# Patient Record
Sex: FEMALE | Race: WHITE | Employment: FULL TIME | URBAN - METROPOLITAN AREA
[De-identification: names, ages, dates, MRNs, and addresses within clinical notes are randomized per-mention and may not be internally consistent; named-entity substitution may affect disease eponyms.]

---

## 2017-01-30 ENCOUNTER — OFFICE VISIT (OUTPATIENT)
Dept: INTERNAL MEDICINE CLINIC | Age: 32
End: 2017-01-30

## 2017-01-30 VITALS
WEIGHT: 195.6 LBS | RESPIRATION RATE: 16 BRPM | DIASTOLIC BLOOD PRESSURE: 68 MMHG | TEMPERATURE: 98 F | SYSTOLIC BLOOD PRESSURE: 115 MMHG | OXYGEN SATURATION: 96 % | BODY MASS INDEX: 35.99 KG/M2 | HEART RATE: 82 BPM | HEIGHT: 62 IN

## 2017-01-30 DIAGNOSIS — G44.89 OTHER HEADACHE SYNDROME: ICD-10-CM

## 2017-01-30 DIAGNOSIS — S06.0X0D CONCUSSION, WITHOUT LOSS OF CONSCIOUSNESS, SUBSEQUENT ENCOUNTER: ICD-10-CM

## 2017-01-30 DIAGNOSIS — M62.838 NECK MUSCLE SPASM: Primary | ICD-10-CM

## 2017-01-30 RX ORDER — CYCLOBENZAPRINE HCL 10 MG
10 TABLET ORAL
Qty: 20 TAB | Refills: 1 | Status: SHIPPED | OUTPATIENT
Start: 2017-01-30

## 2017-01-30 NOTE — PROGRESS NOTES
Patient is in the office today due to a head injury with her hard hat at work. She needs a note to go back to work. 1. Have you been to the ER, urgent care clinic since your last visit? Hospitalized since your last visit? no    2. Have you seen or consulted any other health care providers outside of the [address] [place] [address] since your last visit? Include any pap smears or colon screening.  No

## 2017-01-30 NOTE — MR AVS SNAPSHOT
Visit Information Date & Time Provider Department Dept. Phone Encounter #  
 1/30/2017  3:30 PM Rashad Jeffery MD [facility] [phone] [id number] Your Appointments 8/14/2017  9:00 AM  
PHYSICAL with Rashad Jeffery MD  
Internist of [facility]) Appt Note: ov 1yr rd; rpe  
 [address] [facility], [address] [address] [address]  
  
   
 [address], [address] Upcoming Health Maintenance Date Due DTaP/Tdap/Td series (1 - Tdap) 11/19/2008 INFLUENZA AGE 9 TO ADULT 8/1/2016 PAP AKA CERVICAL CYTOLOGY 2/28/2019 Allergies as of 1/30/2017  Review Complete On: 1/30/2017 By: Luther Jacobsen LPN No Known Allergies Current Immunizations  Reviewed on 1/8/2015 No immunizations on file. Not reviewed this visit Vitals BP Pulse Temp Resp Height(growth percentile) Weight(growth percentile)  
 115/68 (BP 1 Location: Left arm, BP Patient Position: Sitting) 82 98 °F (36.7 °C) (Oral) 16 5' 2\" (1.575 m) 195 lb 9.6 oz (88.7 kg) SpO2 BMI OB Status Smoking Status 96% 35.78 kg/m2 Having regular periods Never Smoker Vitals History BMI and BSA Data Body Mass Index Body Surface Area 35.78 kg/m 2 1.97 m 2 Preferred Pharmacy Pharmacy Name Phone [address], Eliezer Hays [address] [place] [address] [facility] [phone] Your Updated Medication List  
  
   
This list is accurate as of: 1/30/17  4:26 PM.  Always use your most recent med list.  
  
  
  
  
 dextroamphetamine-amphetamine 10 mg tablet Commonly known as:  ADDERALL Take 1 tablet by mouth two (2) times a day. escitalopram oxalate 20 mg tablet Commonly known as:  Rey Barban Take 1 Tab by mouth daily. Introducing [facility]!    
 Rivas Ferrer introduces [employer] patient portal. Now you can access parts of your medical record, email your doctor's office, and request medication refills online. 1. In your internet browser, go to [email or web address] 2. Click on the First Time User? Click Here link in the Sign In box. You will see the New Member Sign Up page. 3. Enter your [employer] Access Code exactly as it appears below. You will not need to use this code after youve completed the sign-up process. If you do not sign up before the expiration date, you must request a new code. · [employer] Access Code: [email or web address] Expires: 4/30/2017  2:55 PM 
 
4. Enter the last four digits of your Social Security Number (xxxx) and Date of Birth (mm/dd/yyyy) as indicated and click Submit. You will be taken to the next sign-up page. 5. Create a [employer] ID. This will be your [employer] login ID and cannot be changed, so think of one that is secure and easy to remember. 6. Create a [employer] password. You can change your password at any time. 7. Enter your Password Reset Question and Answer. This can be used at a later time if you forget your password. 8. Enter your e-mail address. You will receive e-mail notification when new information is available in [address] 9. Click Sign Up. You can now view and download portions of your medical record. 10. Click the Download Summary menu link to download a portable copy of your medical information. If you have questions, please visit the Frequently Asked Questions section of the [employer] website. Remember, [employer] is NOT to be used for urgent needs. For medical emergencies, dial 911. Now available from your iPhone and Android! Please provide this summary of care documentation to your next provider. Your primary care clinician is listed as Geoff Zaragoza. If you have any questions after today's visit, please call [phone].

## 2017-01-31 NOTE — PROGRESS NOTES
36 y.o. WHITE OR  female who presents for evaluation. She was at work on 1/26/17 doing her usual duties, she was wearing a hard hat and trying to leave a work area when she hit her head right frontal side against a scaffolding. It felt like a whiplash injury although she didn't really feel much pain immediately after the event. The next day, she didn't feel right and she was having frontal and occipital intermittent headache along w neck pain without any focal neurologic complaints or radicular sx. [employer] sent her to the [facility] ER with her complaints. They did not do CT head and told her she had mild concussion and neck sprain, told her to take tylenol only w her gastric bypass hx. Currently, she reports no focal neuro complaints, the headache is not debilitating at all and is actually improved and she wants clearance to go back to work    Past Medical History   Diagnosis Date    Attention deficit disorder     Bilateral ovarian cysts      Dr. Santos Hernández Depression 6/16    Dyslipidemia     Obesity      s/p gastric sleeve Dr Currie Bigger 5/14; peak weight 235 lbs, preop wt 208 lbs, target weight 140 lbs    Sciatica      Past Surgical History   Procedure Laterality Date    Pr abdomen surgery proc unlisted  5/14     s/p gastric sleeve Dr Slava Garcia History    Marital status:      Spouse name: N/A    Number of children: 1    Years of education: N/A     Occupational History    [facility] at [facility]  Unknown     Social History Main Topics    Smoking status: Never Smoker    Smokeless tobacco: Never Used    Alcohol use No    Drug use: No    Sexual activity: Not on file     Other Topics Concern    Not on file     Social History Narrative     Current Outpatient Prescriptions   Medication Sig    escitalopram oxalate (LEXAPRO) 20 mg tablet Take 1 Tab by mouth daily.     dextroamphetamine-amphetamine (ADDERALL) 10 mg tablet Take 1 tablet by mouth two (2) times a day.     No current facility-administered medications for this visit. No Known Allergies    REVIEW OF SYSTEMS:   Ophtho - no vision change or eye pain  Oral - no mouth pain, tongue or tooth problems  Ears - no hearing loss, ear pain, fullness, no swallowing problems  Cardiac - no CP, PND, orthopnea, edema, palpitations or syncope  Chest - no breast masses  Resp - no wheezing, chronic coughing, dyspnea  GI - no heartburn, nausea, vomiting, change in bowel habits, bleeding, hemorrhoids  Urinary - no dysuria, hematuria, flank pain, urgency, frequency  Genitals - no genital lesions, discharge, masses, ulceration, warts  Neuro - no focal weakness, numbness, paresthesias, incoordination, ataxia, involuntary movements    Visit Vitals    /68 (BP 1 Location: Left arm, BP Patient Position: Sitting)    Pulse 82    Temp 98 °F (36.7 °C) (Oral)    Resp 16    Ht 5' 2\" (1.575 m)    Wt 195 lb 9.6 oz (88.7 kg)    SpO2 96%    BMI 35.78 kg/m2   A&O x3  Affect is appropriate. Mood stable  No apparent distress  Perrl, eomi  Anicteric, no JVD, adenopathy or thyromegaly. Neck supple w from, mod tenderness and spasm in the bilat paracervical musculature  No carotid bruits or radiated murmur  Lungs clear to auscultation, no wheezes or rales  Heart showed regular rate and rhythm. No murmur, rubs, gallops  Abdomen soft nontender, no hepatosplenomegaly or masses. Extremities without edema. Pulses 1-2+ symmetrically  Neurologic exam grossly nonfocal    Assessment and plan:  1. Probable mild concussion. Quick discussion on this matter. She opted on not getting head imaging as she is better the last couple days. 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 Neck pain and spasm. Flexeril given, she can take short course of motrin 3-5 days prn w meals to help w inflammation, stop if gi upset  3.  Work letter dictated      Above conditions discussed at length and patient vocalized understanding.   All questions answered to patient satifaction

## 2017-08-28 ENCOUNTER — HOSPITAL ENCOUNTER (OUTPATIENT)
Dept: LAB | Age: 32
Discharge: HOME | End: 2017-08-28
Payer: COMMERCIAL

## 2017-08-28 ENCOUNTER — OFFICE VISIT (OUTPATIENT)
Dept: INTERNAL MEDICINE CLINIC | Age: 32
End: 2017-08-28

## 2017-08-28 ENCOUNTER — TELEPHONE (OUTPATIENT)
Dept: INTERNAL MEDICINE CLINIC | Age: 32
End: 2017-08-28

## 2017-08-28 VITALS
WEIGHT: 175 LBS | TEMPERATURE: 98.3 F | RESPIRATION RATE: 12 BRPM | BODY MASS INDEX: 32.2 KG/M2 | SYSTOLIC BLOOD PRESSURE: 106 MMHG | DIASTOLIC BLOOD PRESSURE: 76 MMHG | HEART RATE: 90 BPM | HEIGHT: 62 IN | OXYGEN SATURATION: 97 %

## 2017-08-28 DIAGNOSIS — R11.0 NAUSEA: ICD-10-CM

## 2017-08-28 DIAGNOSIS — B27.90 MONONUCLEOSIS: Primary | ICD-10-CM

## 2017-08-28 LAB
ALBUMIN SERPL-MCNC: 2.8 G/DL (ref 3.4–5)
ALBUMIN/GLOB SERPL: 0.6 {RATIO} (ref 0.8–1.7)
ALP SERPL-CCNC: 404 U/L (ref 45–117)
ALT SERPL-CCNC: 100 U/L (ref 13–56)
ANION GAP SERPL CALC-SCNC: 5 MMOL/L (ref 3–18)
AST SERPL-CCNC: 61 U/L (ref 15–37)
BASOPHILS # BLD: 0 K/UL (ref 0–0.1)
BASOPHILS NFR BLD: 0 % (ref 0–3)
BILIRUB SERPL-MCNC: 0.5 MG/DL (ref 0.2–1)
BUN SERPL-MCNC: 9 MG/DL (ref 7–18)
BUN/CREAT SERPL: 11 (ref 12–20)
CALCIUM SERPL-MCNC: 9.4 MG/DL (ref 8.5–10.1)
CHLORIDE SERPL-SCNC: 102 MMOL/L (ref 100–108)
CO2 SERPL-SCNC: 32 MMOL/L (ref 21–32)
CREAT SERPL-MCNC: 0.82 MG/DL (ref 0.6–1.3)
DIFFERENTIAL METHOD BLD: ABNORMAL
EOSINOPHIL # BLD: 0 K/UL (ref 0–0.4)
EOSINOPHIL NFR BLD: 0 % (ref 0–5)
ERYTHROCYTE [DISTWIDTH] IN BLOOD BY AUTOMATED COUNT: 14.2 % (ref 11.6–14.5)
GLOBULIN SER CALC-MCNC: 5 G/DL (ref 2–4)
GLUCOSE SERPL-MCNC: 84 MG/DL (ref 74–99)
HCT VFR BLD AUTO: 40.6 % (ref 35–45)
HGB BLD-MCNC: 13.2 G/DL (ref 12–16)
LYMPHOCYTES # BLD: 7.7 K/UL (ref 0.8–3.5)
LYMPHOCYTES NFR BLD: 68 % (ref 20–51)
MCH RBC QN AUTO: 30.8 PG (ref 24–34)
MCHC RBC AUTO-ENTMCNC: 32.5 G/DL (ref 31–37)
MCV RBC AUTO: 94.9 FL (ref 74–97)
MONOCYTES # BLD: 1.4 K/UL (ref 0–1)
MONOCYTES NFR BLD: 12 % (ref 2–9)
NEUTS SEG # BLD: 2.3 K/UL (ref 1.8–8)
NEUTS SEG NFR BLD: 20 % (ref 42–75)
PLATELET # BLD AUTO: 181 K/UL (ref 135–420)
PLATELET COMMENTS,PCOM: ABNORMAL
PMV BLD AUTO: 10.4 FL (ref 9.2–11.8)
POTASSIUM SERPL-SCNC: 4.2 MMOL/L (ref 3.5–5.5)
PROT SERPL-MCNC: 7.8 G/DL (ref 6.4–8.2)
RBC # BLD AUTO: 4.28 M/UL (ref 4.2–5.3)
RBC MORPH BLD: ABNORMAL
SODIUM SERPL-SCNC: 139 MMOL/L (ref 136–145)
WBC # BLD AUTO: 11.4 K/UL (ref 4.6–13.2)
WBC MORPH BLD: ABNORMAL

## 2017-08-28 PROCEDURE — 80053 COMPREHEN METABOLIC PANEL: CPT | Performed by: INTERNAL MEDICINE

## 2017-08-28 PROCEDURE — 85025 COMPLETE CBC W/AUTO DIFF WBC: CPT | Performed by: INTERNAL MEDICINE

## 2017-08-28 RX ORDER — ONDANSETRON 4 MG/1
4 TABLET, ORALLY DISINTEGRATING ORAL
COMMUNITY

## 2017-08-28 RX ORDER — NORETHINDRONE ACETATE AND ETHINYL ESTRADIOL 1; .02 MG/1; MG/1
TABLET ORAL
COMMUNITY

## 2017-08-28 NOTE — TELEPHONE ENCOUNTER
----- Message from Pooja Wilkerson MD sent at 8/28/2017 10:07 AM EDT -----  Records [facility] at the [facility] from 8/21/17

## 2017-08-28 NOTE — MR AVS SNAPSHOT
Visit Information Date & Time Provider Department Dept. Phone Encounter #  
 8/28/2017  9:15 AM Mayi Marks MD [facility] [phone] Upcoming Health Maintenance Date Due DTaP/Tdap/Td series (1 - Tdap) 11/19/2008 INFLUENZA AGE 9 TO ADULT 8/1/2017 PAP AKA CERVICAL CYTOLOGY 2/28/2019 Allergies as of 8/28/2017  Review Complete On: 8/28/2017 By: Jacob Mcmillan No Known Allergies Current Immunizations  Reviewed on 1/8/2015 No immunizations on file. Not reviewed this visit You Were Diagnosed With   
  
 Codes Comments Nausea    -  Primary ICD-10-CM: R11.0 ICD-9-CM: 787.02 Vitals BP Pulse Temp Resp Height(growth percentile) Weight(growth percentile) 106/76 (BP 1 Location: Left arm, BP Patient Position: Sitting) 90 98.3 °F (36.8 °C) (Oral) 12 5' 2\" (1.575 m) 175 lb (79.4 kg) LMP SpO2 BMI OB Status Smoking Status 08/22/2017 97% 32.01 kg/m2 Having regular periods Never Smoker Vitals History BMI and BSA Data Body Mass Index Body Surface Area 32.01 kg/m 2 1.86 m 2 Preferred Pharmacy Pharmacy Name Phone [address], Eliezer Hays [address] [place] [address] [facility] [phone] Your Updated Medication List  
  
   
This list is accurate as of: 8/28/17 10:11 AM.  Always use your most recent med list.  
  
  
  
  
 cyclobenzaprine 10 mg tablet Commonly known as:  FLEXERIL Take 1 Tab by mouth three (3) times daily as needed for Muscle Spasm(s). dextroamphetamine-amphetamine 10 mg tablet Commonly known as:  ADDERALL Take 1 tablet by mouth two (2) times a day. escitalopram oxalate 20 mg tablet Commonly known as:  Jo Salt Take 1 Tab by mouth daily. MICROGESTIN 1/20 (21) 1-20 mg-mcg Tab Generic drug:  norethindrone-ethinyl estradiol Take  by mouth. ondansetron 4 mg disintegrating tablet Commonly known as:  ZOFRAN ODT Take 4 mg by mouth every eight (8) hours as needed for Nausea. To-Do List   
 08/28/2017 Lab:  CBC WITH AUTOMATED DIFF   
  
 08/28/2017 Lab:  METABOLIC PANEL, COMPREHENSIVE Introducing [facility]! Susan Campos introduces [employer] patient portal. Now you can access parts of your medical record, email your doctor's office, and request medication refills online. 1. In your internet browser, go to [email or web address] 2. Click on the First Time User? Click Here link in the Sign In box. You will see the New Member Sign Up page. 3. Enter your [employer] Access Code exactly as it appears below. You will not need to use this code after youve completed the sign-up process. If you do not sign up before the expiration date, you must request a new code. · [employer] Access Code: [email or web address] Expires: 11/12/2017  9:46 AM 
 
4. Enter the last four digits of your Social Security Number (xxxx) and Date of Birth (mm/dd/yyyy) as indicated and click Submit. You will be taken to the next sign-up page. 5. Create a [employer] ID. This will be your [employer] login ID and cannot be changed, so think of one that is secure and easy to remember. 6. Create a [employer] password. You can change your password at any time. 7. Enter your Password Reset Question and Answer. This can be used at a later time if you forget your password. 8. Enter your e-mail address. You will receive e-mail notification when new information is available in [address] 9. Click Sign Up. You can now view and download portions of your medical record. 10. Click the Download Summary menu link to download a portable copy of your medical information. If you have questions, please visit the Frequently Asked Questions section of the [employer] website. Remember, [employer] is NOT to be used for urgent needs. For medical emergencies, dial 911. Now available from your iPhone and Android! Please provide this summary of care documentation to your next provider. Your primary care clinician is listed as Jayson Murphy. If you have any questions after today's visit, please call [phone].

## 2017-08-28 NOTE — PROGRESS NOTES
1. Have you been to the ER, urgent care clinic or hospitalized since your last visit? YES. Follow up from being seen at [facility] 8/21/2017 for Dehydration    2. Have you seen or consulted any other health care providers outside of the [address] [place] [address] since your last visit (Include any pap smears or colon screening)? NO      Do you have an Advanced Directive? NO    Would you like information on Advanced Directives? NO    Health Maintenance Due   Topic Date Due    DTaP/Tdap/Td series (1 - Tdap) 11/19/2008    INFLUENZA AGE 9 TO ADULT  08/01/2017       Patient states she went to [facility] for dehydration and mononucleosis. Patient states that the symptoms of headache, nausea, fever, tiredness, and dehydration  came on a day before going to [facility] on 8/21/2017. Patient states she is getting a little better and drinking more water. I drew patient's blood from the left antecubital area. Patient tolerated well without complications. room air

## 2017-08-29 ENCOUNTER — TELEPHONE (OUTPATIENT)
Dept: INTERNAL MEDICINE CLINIC | Age: 32
End: 2017-08-29

## 2017-08-29 DIAGNOSIS — B27.90 MONONUCLEOSIS: Primary | ICD-10-CM

## 2017-08-29 NOTE — TELEPHONE ENCOUNTER
pls call      Results for orders placed or performed during the hospital encounter of 08/28/17   CBC WITH AUTOMATED DIFF   Result Value Ref Range    WBC 11.4 4.6 - 13.2 K/uL    RBC 4.28 4.20 - 5.30 M/uL    HGB 13.2 12.0 - 16.0 g/dL    HCT 40.6 35.0 - 45.0 %    MCV 94.9 74.0 - 97.0 FL    MCH 30.8 24.0 - 34.0 PG    MCHC 32.5 31.0 - 37.0 g/dL    RDW 14.2 11.6 - 14.5 %    PLATELET 663 779 - 090 K/uL    MPV 10.4 9.2 - 11.8 FL    NEUTROPHILS 20 (L) 42 - 75 %    LYMPHOCYTES 68 (H) 20 - 51 %    MONOCYTES 12 (H) 2 - 9 %    EOSINOPHILS 0 0 - 5 %    BASOPHILS 0 0 - 3 %    ABS. NEUTROPHILS 2.3 1.8 - 8.0 K/UL    ABS. LYMPHOCYTES 7.7 (H) 0.8 - 3.5 K/UL    ABS. MONOCYTES 1.4 (H) 0 - 1.0 K/UL    ABS. EOSINOPHILS 0.0 0.0 - 0.4 K/UL    ABS. BASOPHILS 0.0 0.0 - 0.1 K/UL    PLATELET COMMENTS ADEQUATE PLATELETS      RBC COMMENTS NORMOCYTIC, NORMOCHROMIC      WBC COMMENTS FEW ATYPICAL LYMPHS     DF MANUAL     METABOLIC PANEL, COMPREHENSIVE   Result Value Ref Range    Sodium 139 136 - 145 mmol/L    Potassium 4.2 3.5 - 5.5 mmol/L    Chloride 102 100 - 108 mmol/L    CO2 32 21 - 32 mmol/L    Anion gap 5 3.0 - 18 mmol/L    Glucose 84 74 - 99 mg/dL    BUN 9 7.0 - 18 MG/DL    Creatinine 0.82 0.6 - 1.3 MG/DL    BUN/Creatinine ratio 11 (L) 12 - 20      GFR est AA >60 >60 ml/min/1.73m2    GFR est non-AA >60 >60 ml/min/1.73m2    Calcium 9.4 8.5 - 10.1 MG/DL    Bilirubin, total 0.5 0.2 - 1.0 MG/DL    ALT (SGPT) 100 (H) 13 - 56 U/L    AST (SGOT) 61 (H) 15 - 37 U/L    Alk.  phosphatase 404 (H) 45 - 117 U/L    Protein, total 7.8 6.4 - 8.2 g/dL    Albumin 2.8 (L) 3.4 - 5.0 g/dL    Globulin 5.0 (H) 2.0 - 4.0 g/dL    A-G Ratio 0.6 (L) 0.8 - 1.7       pls call    lfts still elev although not much diff from last week  plt count better/u on lfts - ordered

## 2017-08-29 NOTE — PROGRESS NOTES
36 y.o. WHITE OR  female who presents for evaluation. She is here to f/u after her urgent care visit on 8/21/17. She presented w complaints of tiredness and dehydration. She had worked hard physically, then played baseball in hot weather the 2 days before she presented. She felt lightheaded, had headache, nausea without diarrhea, although no fever, sinus sx, dysuria, she went to urgent care when she noted 'dark urine'. She had t 99.9, was noted to have posterior cervical ln, wbc 4.8K w >50%L and 11% monos, plt 111, alt 101, ast 105, ak 230, tb 0.9, monospot+. Sent home w dx of mononucleosis and told to treat sx as needed. She feels better, did have t>100 along w worsening lymphadenopathy in the intervening days. Today, she feels ok, the urine sx have cleared    Past Medical History:   Diagnosis Date    Attention deficit disorder     Bilateral ovarian cysts     Dr. Sharon Pa Depression 6/16    Dyslipidemia     Mononucleosis 08/2017    Obesity     s/p gastric sleeve Dr [facility] 5/14; peak weight 235 lbs, preop wt 208 lbs, target weight 140 lbs    Sciatica      Current Outpatient Prescriptions   Medication Sig    norethindrone-ethinyl estradiol (MICROGESTIN 1/20, 21,) 1-20 mg-mcg tab Take  by mouth.  ondansetron (ZOFRAN ODT) 4 mg disintegrating tablet Take 4 mg by mouth every eight (8) hours as needed for Nausea.  cyclobenzaprine (FLEXERIL) 10 mg tablet Take 1 Tab by mouth three (3) times daily as needed for Muscle Spasm(s).  escitalopram oxalate (LEXAPRO) 20 mg tablet Take 1 Tab by mouth daily.  dextroamphetamine-amphetamine (ADDERALL) 10 mg tablet Take 1 tablet by mouth two (2) times a day. No current facility-administered medications for this visit.       No Known Allergies    Visit Vitals    /76 (BP 1 Location: Left arm, BP Patient Position: Sitting)    Pulse 90    Temp 98.3 °F (36.8 °C) (Oral)    Resp 12    Ht 5' 2\" (1.575 m)    Wt 175 lb (79.4 kg)    SpO2 97%    BMI 32.01 kg/m2   tm wnl, sinuses nt, minimally tender lymphadenopathy in ant and post cervical chains, op benign. Lungs cta, heart showed rrr, abd soft and nt, no clear hsm or masses    Assessment and plan:  1. Probable mononucleosis. Symptomatic treatment. Check f/u labs w low plt and transaminasemia, call w update      Above conditions discussed at length and patient vocalized understanding.   All questions answered to patient satisfaction

## 2017-08-30 NOTE — TELEPHONE ENCOUNTER
Spoke with patient and given results below, verbalized understanding and she will have hepatic function tests drawn in about a week to f/u on liver enzymes.

## 2018-03-17 ENCOUNTER — HOSPITAL ENCOUNTER (EMERGENCY)
Age: 33
Discharge: HOME | End: 2018-03-17
Attending: EMERGENCY MEDICINE | Admitting: EMERGENCY MEDICINE
Payer: COMMERCIAL

## 2018-03-17 VITALS
WEIGHT: 163 LBS | HEART RATE: 79 BPM | BODY MASS INDEX: 30 KG/M2 | SYSTOLIC BLOOD PRESSURE: 109 MMHG | HEIGHT: 62 IN | OXYGEN SATURATION: 99 % | RESPIRATION RATE: 21 BRPM | TEMPERATURE: 97.5 F | DIASTOLIC BLOOD PRESSURE: 65 MMHG

## 2018-03-17 DIAGNOSIS — E87.6 HYPOKALEMIA: ICD-10-CM

## 2018-03-17 DIAGNOSIS — F10.929 ALCOHOLIC INTOXICATION WITH COMPLICATION (HCC): Primary | ICD-10-CM

## 2018-03-17 LAB
ANION GAP SERPL CALC-SCNC: 13 MMOL/L (ref 3–18)
BASOPHILS # BLD: 0 K/UL (ref 0–0.06)
BASOPHILS NFR BLD: 1 % (ref 0–2)
BUN SERPL-MCNC: 11 MG/DL (ref 7–18)
BUN/CREAT SERPL: 13 (ref 12–20)
CALCIUM SERPL-MCNC: 8.8 MG/DL (ref 8.5–10.1)
CHLORIDE SERPL-SCNC: 105 MMOL/L (ref 100–108)
CO2 SERPL-SCNC: 21 MMOL/L (ref 21–32)
CREAT SERPL-MCNC: 0.88 MG/DL (ref 0.6–1.3)
DIFFERENTIAL METHOD BLD: ABNORMAL
EOSINOPHIL # BLD: 0.1 K/UL (ref 0–0.4)
EOSINOPHIL NFR BLD: 1 % (ref 0–5)
ERYTHROCYTE [DISTWIDTH] IN BLOOD BY AUTOMATED COUNT: 12.7 % (ref 11.6–14.5)
GLUCOSE SERPL-MCNC: 125 MG/DL (ref 74–99)
HCT VFR BLD AUTO: 38 % (ref 35–45)
HGB BLD-MCNC: 13.4 G/DL (ref 12–16)
LYMPHOCYTES # BLD: 5.4 K/UL (ref 0.9–3.6)
LYMPHOCYTES NFR BLD: 63 % (ref 21–52)
MCH RBC QN AUTO: 32.5 PG (ref 24–34)
MCHC RBC AUTO-ENTMCNC: 35.3 G/DL (ref 31–37)
MCV RBC AUTO: 92.2 FL (ref 74–97)
MONOCYTES # BLD: 0.4 K/UL (ref 0.05–1.2)
MONOCYTES NFR BLD: 5 % (ref 3–10)
NEUTS SEG # BLD: 2.6 K/UL (ref 1.8–8)
NEUTS SEG NFR BLD: 30 % (ref 40–73)
PLATELET # BLD AUTO: 244 K/UL (ref 135–420)
PMV BLD AUTO: 9.8 FL (ref 9.2–11.8)
POTASSIUM SERPL-SCNC: 3 MMOL/L (ref 3.5–5.5)
RBC # BLD AUTO: 4.12 M/UL (ref 4.2–5.3)
SODIUM SERPL-SCNC: 139 MMOL/L (ref 136–145)
WBC # BLD AUTO: 8.5 K/UL (ref 4.6–13.2)

## 2018-03-17 PROCEDURE — 74011250637 HC RX REV CODE- 250/637: Performed by: EMERGENCY MEDICINE

## 2018-03-17 PROCEDURE — 85025 COMPLETE CBC W/AUTO DIFF WBC: CPT | Performed by: EMERGENCY MEDICINE

## 2018-03-17 PROCEDURE — 80048 BASIC METABOLIC PNL TOTAL CA: CPT | Performed by: EMERGENCY MEDICINE

## 2018-03-17 PROCEDURE — 99285 EMERGENCY DEPT VISIT HI MDM: CPT

## 2018-03-17 RX ORDER — POTASSIUM CHLORIDE 20 MEQ/1
60 TABLET, EXTENDED RELEASE ORAL
Status: COMPLETED | OUTPATIENT
Start: 2018-03-17 | End: 2018-03-17

## 2018-03-17 RX ADMIN — POTASSIUM CHLORIDE 60 MEQ: 1500 TABLET, FILM COATED, EXTENDED RELEASE ORAL at 21:45

## 2018-03-17 NOTE — ED TRIAGE NOTES
Patient had bypass 2 years ago. Had too much to drink, found in the restroom throwing up. Pt denies any pain. Alert and oriented.

## 2018-03-18 NOTE — ED NOTES
I have reviewed discharge instructions with the patient. The patient  verbalized understanding. Patient armband removed and shredded.

## 2018-03-18 NOTE — ED PROVIDER NOTES
EMERGENCY DEPARTMENT HISTORY AND PHYSICAL EXAM    8:04 PM      Date: 3/17/2018  Patient Name: Coty Victor    History of Presenting Illness     Chief Complaint   Patient presents with    Vomiting    Alcohol intoxication         History Provided By: Patient    Chief Complaint: ETOH intoxication  Duration:  Prior to arrival  Timing:  Acute  Location: n/a  Quality: n/a  Severity: Moderate  Modifying Factors: n/a  Associated Symptoms: vomiting      Additional History (Context): 8:05 PM Coty Victor is a 29 y.o. female with h/o Sciatica, Dyslipidemia who presents to ED for evaluation of acute moderate ETOH intoxication associated with vomiting onset prior to arrival. Patient able to answer one word answers to questions. Denies falling or hitting her head. Denies pain. Hx limited due to ETOH intoxication and acuity of condition. PCP: Geovany Howard MD      Current Facility-Administered Medications   Medication Dose Route Frequency Provider Last Rate Last Dose    potassium chloride (K-DUR, KLOR-CON) SR tablet 60 mEq  60 mEq Oral NOW Yifan Reid MD         Current Outpatient Prescriptions   Medication Sig Dispense Refill    norethindrone-ethinyl estradiol (MICROGESTIN 1/20, 21,) 1-20 mg-mcg tab Take  by mouth.  ondansetron (ZOFRAN ODT) 4 mg disintegrating tablet Take 4 mg by mouth every eight (8) hours as needed for Nausea.  cyclobenzaprine (FLEXERIL) 10 mg tablet Take 1 Tab by mouth three (3) times daily as needed for Muscle Spasm(s). 20 Tab 1    escitalopram oxalate (LEXAPRO) 20 mg tablet Take 1 Tab by mouth daily. 30 Tab 5    dextroamphetamine-amphetamine (ADDERALL) 10 mg tablet Take 1 tablet by mouth two (2) times a day.  60 tablet 0       Past History     Past Medical History:  Past Medical History:   Diagnosis Date    Attention deficit disorder     Bilateral ovarian cysts     Dr. Aaron Moran Depression 6/16    Dyslipidemia     Mononucleosis 08/2017    Obesity     s/p gastric sleeve  Yen Niece 5/14; peak weight 235 lbs, preop wt 208 lbs, target weight 140 lbs    Sciatica        Past Surgical History:  Past Surgical History:   Procedure Laterality Date    ABDOMEN SURGERY PROC UNLISTED  5/14    s/p gastric sleeve Dr Yen Bullard       Family History:  Family History   Problem Relation Age of Onset    Cancer Mother      ovarian    Cancer Maternal Grandmother      breast    Hypertension Paternal Grandfather     Diabetes Paternal Grandfather     Stroke Paternal Grandfather        Social History:  Social History   Substance Use Topics    Smoking status: Never Smoker    Smokeless tobacco: Never Used    Alcohol use No       Allergies:  No Known Allergies      Review of Systems     Review of Systems   Unable to perform ROS: Acuity of condition (ETOH intoxication)         Physical Exam     Visit Vitals    /57    Pulse 98    Temp 97.5 °F (36.4 °C)    Resp 24    Ht 5' 2\" (1.575 m)    Wt 73.9 kg (163 lb)    SpO2 100%    BMI 29.81 kg/m2       Physical Exam   Constitutional: She appears well-developed and well-nourished. One word answers to questions   HENT:   Head: Normocephalic and atraumatic. Eyes: Conjunctivae are normal. No scleral icterus. Neck: Normal range of motion. Neck supple. No JVD present. Cardiovascular: Normal rate, regular rhythm and normal heart sounds. 4 intact extremity pulses   Pulmonary/Chest: Effort normal and breath sounds normal.   Abdominal: Soft. She exhibits no mass. There is no tenderness. Musculoskeletal: Normal range of motion. Lymphadenopathy:     She has no cervical adenopathy. Neurological: She is alert. Nonfocal.  Grossly nml   Skin: Skin is warm and dry. Nursing note and vitals reviewed.         Diagnostic Study Results     Labs -  Recent Results (from the past 12 hour(s))   CBC WITH AUTOMATED DIFF    Collection Time: 03/17/18  7:38 PM   Result Value Ref Range    WBC 8.5 4.6 - 13.2 K/uL    RBC 4.12 (L) 4.20 - 5.30 M/uL HGB 13.4 12.0 - 16.0 g/dL    HCT 38.0 35.0 - 45.0 %    MCV 92.2 74.0 - 97.0 FL    MCH 32.5 24.0 - 34.0 PG    MCHC 35.3 31.0 - 37.0 g/dL    RDW 12.7 11.6 - 14.5 %    PLATELET 250 869 - 134 K/uL    MPV 9.8 9.2 - 11.8 FL    NEUTROPHILS 30 (L) 40 - 73 %    LYMPHOCYTES 63 (H) 21 - 52 %    MONOCYTES 5 3 - 10 %    EOSINOPHILS 1 0 - 5 %    BASOPHILS 1 0 - 2 %    ABS. NEUTROPHILS 2.6 1.8 - 8.0 K/UL    ABS. LYMPHOCYTES 5.4 (H) 0.9 - 3.6 K/UL    ABS. MONOCYTES 0.4 0.05 - 1.2 K/UL    ABS. EOSINOPHILS 0.1 0.0 - 0.4 K/UL    ABS. BASOPHILS 0.0 0.0 - 0.06 K/UL    DF AUTOMATED     METABOLIC PANEL, BASIC    Collection Time: 03/17/18  7:38 PM   Result Value Ref Range    Sodium 139 136 - 145 mmol/L    Potassium 3.0 (L) 3.5 - 5.5 mmol/L    Chloride 105 100 - 108 mmol/L    CO2 21 21 - 32 mmol/L    Anion gap 13 3.0 - 18 mmol/L    Glucose 125 (H) 74 - 99 mg/dL    BUN 11 7.0 - 18 MG/DL    Creatinine 0.88 0.6 - 1.3 MG/DL    BUN/Creatinine ratio 13 12 - 20      GFR est AA >60 >60 ml/min/1.73m2    GFR est non-AA >60 >60 ml/min/1.73m2    Calcium 8.8 8.5 - 10.1 MG/DL       Radiologic Studies -   No orders to display     No results found. Medical Decision Making   Initial Medical Decision Making and DDx:  Consistent with ETOH intoxication. Will evaluate for electrolyte abnormality and hyperglycemia. Do not suspect head trauma. Will monitor until she recovers. ED Course: Progress Notes, Reevaluation, and Consults:  9:11 PM Patient has low potassium, will order potassium. 9:41 PM Patient ambulated to the bathroom and is ready to go. Will DC. Patient instructed to return for emergencies. Questions answered and she is happy with plan. I am the first provider for this patient. I reviewed the vital signs, available nursing notes, past medical history, past surgical history, family history and social history. Vital Signs-Reviewed the patient's vital signs.     Pulse Oximetry Analysis - 100% in room air, Normal    Records Reviewed: Nursing Notes and Old Medical Records (Time of Review: 8:04 PM)    Diagnosis     Clinical Impression:   1. Alcoholic intoxication with complication ([address]    2. Hypokalemia        Disposition: DC    Follow-up Information     Follow up With Details Comments 354 Francheska Fisher MD In 2 days  [address]  [phone]             Patient's Medications   Start Taking    No medications on file   Continue Taking    CYCLOBENZAPRINE (FLEXERIL) 10 MG TABLET    Take 1 Tab by mouth three (3) times daily as needed for Muscle Spasm(s). DEXTROAMPHETAMINE-AMPHETAMINE (ADDERALL) 10 MG TABLET    Take 1 tablet by mouth two (2) times a day. ESCITALOPRAM OXALATE (LEXAPRO) 20 MG TABLET    Take 1 Tab by mouth daily. NORETHINDRONE-ETHINYL ESTRADIOL (MICROGESTIN 1/20, 21,) 1-20 MG-MCG TAB    Take  by mouth. ONDANSETRON (ZOFRAN ODT) 4 MG DISINTEGRATING TABLET    Take 4 mg by mouth every eight (8) hours as needed for Nausea. These Medications have changed    No medications on file   Stop Taking    No medications on file     _______________________________    Attestations:  Harshil Mckeoncinthia Degroot acting as a scribe for and in the presence of Garfield Garcia MD      March 17, 2018 at 8:04 PM       Provider Attestation:      I personally performed the services described in the documentation, reviewed the documentation, as recorded by the scribe in my presence, and it accurately and completely records my words and actions.  March 17, 2018 at 8:04 PM - Garfield Garcia MD    _______________________________